# Patient Record
Sex: MALE | Race: WHITE
[De-identification: names, ages, dates, MRNs, and addresses within clinical notes are randomized per-mention and may not be internally consistent; named-entity substitution may affect disease eponyms.]

---

## 2019-06-09 ENCOUNTER — HOSPITAL ENCOUNTER (EMERGENCY)
Dept: HOSPITAL 59 - ER | Age: 49
Discharge: HOME | End: 2019-06-09
Payer: COMMERCIAL

## 2019-06-09 DIAGNOSIS — S90.02XA: Primary | ICD-10-CM
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
## 2019-06-09 PROCEDURE — 99283 EMERGENCY DEPT VISIT LOW MDM: CPT

## 2019-06-09 NOTE — EMERGENCY DEPARTMENT RECORD
History of Present Illness





- General


Chief complaint: Lower Extremity Pain


Stated complaint: LT FOOT INJURY


Time Seen by Provider: 06/09/19 19:37


Source: Patient


Mode of Arrival: Ambulatory


Limitations: No limitations





- History of Present Illness


Initial comments: 


49 yo male presents with left lateral ankle injury.  He was working when a drill

fell off a ladder and struck his left lateral ankle.  The tip causes a 

superficial abrasion.  Over the last three hours he has developed some pain and 

swelling.  His last tetanus was May of 2015 (less than 5 years).  No numbness or

tingling.





MD Complaint: Extremity pain, Joint pain


-: Hour(s) (3)


Location: Left


-: Yes Arthralgia


Radiation: Distal


Quality: Aching


Consistency: Constant





- Related Data


                                  Previous Rx's











 Medication  Instructions  Recorded


 


Cephalexin [Keflex] 500 mg PO TID #21 cap 06/09/19











                                    Allergies











Allergy/AdvReac Type Severity Reaction Status Date / Time


 


No Known Drug Allergies Allergy   Unverified 10/18/17 15:01














Past Medical History





- SOCIAL HISTORY


Smoking Status: Never smoker





- RESPIRATORY


Hx Respiratory Disorders: No





- CARDIOVASCULAR


Hx Cardio Disorders: No





- NEURO


Hx Neuro Disorders: No





- GI


Hx GI Disorders: No





- 


Hx Genitourinary Disorders: No





- ENDOCRINE


Hx Endocrine Disorders: No





- MUSCULOSKELETAL


Hx Musculoskeletal Disorders: No





- PSYCH


Hx Psych Problems: No





- HEMATOLOGY/ONCOLOGY


Hx Hematology/Oncology Disorders: No





Family Medical History


Hx Cancer: Father


Hx Diabetes: Father


Hx Heart Disease: Father


Hx HTN: Father


Hx Stroke: Grandparents





Physical Exam





- General


General Appearance: Alert, Oriented x3, Cooperative, No acute distress


Limitations: No limitations





- Head


Head exam: Atraumatic, Normal inspection





- Eye


Eye exam: Normal appearance





- ENT


ENT exam: Normal exam


Ear exam: Normal external inspection


Nasal Exam: Normal inspection


Mouth exam: Normal external inspection





- Neck


Neck exam: Normal inspection





- Cardiovascular


Peripheral Pulses: 2+: Dorsalis Pedis (L)





- Rectal


Rectal exam: Deferred





- 


 exam: Deferred





- Extremities


Extremities exam: Joint swelling, Normal capillary refill, Tenderness.  

negative: Normal inspection


Image of Feet: 


                            __________________________














                            __________________________





 1 - mild soft tissue swelling, no erythema, 5mm abrasion with very superficial 

break in the skin, full ROM








- Neurological


Neurological exam: Alert, Oriented X3





- Psychiatric


Psychiatric exam: Normal affect, Normal mood





- Skin


Skin exam: Abrasion





Course





                                   Vital Signs











  06/09/19





  19:33


 


Temperature 98.8 F


 


Pulse Rate [ 91 H





Pulse Ox Probe] 


 


Respiratory 20





Rate 


 


Blood Pressure 131/84





[Left Arm] 


 


Pulse Ox 98














- Reevaluation(s)


Reevaluation #1: 





06/09/19 20:05


The tetanus is up to date at less than 5 years


The XR was reviewed.  No acute bony abnormality or FB


We discussed things to do to minimize swelling, monitor for signs of infection 

and follow up


Although not deep we discussed puncture wounds and increased risk for infection 

so Keflex added as well


Decline crutches.  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


06/09/19 20:06








Disposition


Disposition: Discharge


Clinical Impression: 


Contusion of ankle


Qualifiers:


 Encounter type: initial encounter 





Disposition: Home, Self-Care


Condition: (1) Good


Instructions:  Puncture Wound (ED)


Additional Instructions: 


Call your doctor for the next available follow up appointment


Review this ER visit and the tests performed with your family doctor


Return to the ER for a recheck if worse, any new concerns or questions


Keep the foot elevated as much as possible to minimize swelling


You may ice the area to minimize swelling as well


Take the prescriptions provided as directed


Prescriptions: 


Cephalexin [Keflex] 500 mg PO TID #21 cap


Forms:  Patient Portal Access


Time of Disposition: 20:07





Quality





- Quality Measures


Quality Measures: N/A





- Blood Pressure Screening


Does Patient Have Any of the Following: No


Blood Pressure Classification: Pre-Hypertensive BP Reading


Systolic Measurement: 131


Diastolic Measurement: 84


Screening for High Blood Pressure: < Pre-Hypertensive BP, F/U Documented > 

[]


Pre-Hypertensive Follow-up Interventions: Referral to alternative/primary care 

provider.

## 2019-06-11 NOTE — RADIOLOGY REPORT
EXAM:  LEFT ANKLE 



HISTORY:  PATIENT HAS INJURY TO THE LEFT ANKLE.   



TECHNIQUE:  Three views of the left ankle are provided without comparison 
examinations.  



FINDINGS:  There is no radiographic evidence of a fracture or dislocation of the
left ankle.  Soft tissue swelling is noted over the lateral malleolus.  No 
radiopaque foreign bodies are identified.  



Lateral projection suggests tibiotalar arthrosis.  



IMPRESSION:  

SOFT TISSUE SWELLING IS NOTED OVER THE LATERAL MALLEOLUS WITHOUT RADIOGRAPHIC 
EVIDENCE OF A FRACTURE OR DISLOCATION OF THE LEFT ANKLE.  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 
SUGGESTIVE OF MILD TIBIOTALAR ARTHROSIS.   



JOB NUMBER:  [id number]

[facility]